# Patient Record
Sex: FEMALE | Race: WHITE | NOT HISPANIC OR LATINO | URBAN - METROPOLITAN AREA
[De-identification: names, ages, dates, MRNs, and addresses within clinical notes are randomized per-mention and may not be internally consistent; named-entity substitution may affect disease eponyms.]

---

## 2022-09-13 ENCOUNTER — EMERGENCY (EMERGENCY)
Facility: HOSPITAL | Age: 27
LOS: 1 days | Discharge: HOME | End: 2022-09-13
Attending: EMERGENCY MEDICINE | Admitting: EMERGENCY MEDICINE
Payer: COMMERCIAL

## 2022-09-13 VITALS
OXYGEN SATURATION: 97 % | WEIGHT: 143.96 LBS | HEART RATE: 79 BPM | RESPIRATION RATE: 17 BRPM | SYSTOLIC BLOOD PRESSURE: 117 MMHG | HEIGHT: 68 IN | TEMPERATURE: 98 F | DIASTOLIC BLOOD PRESSURE: 77 MMHG

## 2022-09-13 PROCEDURE — 99283 EMERGENCY DEPT VISIT LOW MDM: CPT | Mod: 25

## 2022-09-13 PROCEDURE — 71046 X-RAY EXAM CHEST 2 VIEWS: CPT | Mod: 26

## 2022-09-13 PROCEDURE — 99284 EMERGENCY DEPT VISIT MOD MDM: CPT | Mod: 25

## 2022-09-13 PROCEDURE — 71046 X-RAY EXAM CHEST 2 VIEWS: CPT

## 2022-09-13 NOTE — ED PROVIDER NOTE - PATIENT PORTAL LINK FT
You can access the FollowMyHealth Patient Portal offered by [facility] by registering at the following website: [email or web address]. By joining [employer]’s FollowMyHealth portal, you will also be able to view your health information using other applications (apps) compatible with our system.

## 2022-09-13 NOTE — ED PROVIDER NOTE - PROGRESS NOTE DETAILS
K[place]: cxr wnl, remains w/ no systemic symptoms. Discussed importance of outpt follow up and return precautions. Clinically no indication for further emergent ED workup or hospitalization at this time. Stable for dc, outpt f/u.

## 2022-09-13 NOTE — ED PROVIDER NOTE - OBJECTIVE STATEMENT
27F no PMH p/w cough. Has non-productive cough, x2.5w, worse at night. On ROS pt notes mild nasal congestion at night. Went to  ~1,5w ago and given inhaler and mucinex w/o relief. Pt's aunt is a doctor, prescribed z-pack, finished yesterday. Still has persistent cough so came to ED. No other systemic symptoms.   Recent travel from [place]. No hiking/caving or other known environmental exposures.   Works as teacher.  Training for marathon, able to run >18 miles w/o any issues.   Denies f/c, sore throat, rashes, joint pains, NVD, abd pain, urinary complaints, similar prior episodes.

## 2022-09-13 NOTE — ED PROVIDER NOTE - PHYSICAL EXAMINATION
occasional non-productive cough.  no LE edema, normal equal distal pulses, steady unassisted gait.   No tonsillar hypertrophy, exudates, erythema. No obvious LAD. No trismus. No stridor/drooling, neck FROM. Normal sounding voice. Uvula midline. No facial swelling.

## 2022-09-13 NOTE — ED PROVIDER NOTE - CLINICAL SUMMARY MEDICAL DECISION MAKING FREE TEXT BOX
27F no PMH p/w cough. Has non-productive cough, x2.5w, worse at night. On ROS pt notes mild nasal congestion at night. Went to  ~1,5w ago and given inhaler and mucinex w/o relief. Pt's aunt is a doctor, prescribed z-pack, finished yesterday. Still has persistent cough so came to ED. No other systemic symptoms.   Vitals wnl, exam as above.  ddx: Cough. Clinically benign.  Possible GERD/asthma/post-nasal drip/viral.  CXR, reassess, likely outpt f/u.

## 2022-09-13 NOTE — ED ADULT NURSE NOTE - OBJECTIVE STATEMENT
27 y.o female c/o cough x 2.5 weeks. Pt noted with dry, barking cough. Pt prescribed inhaler and zpak- finished z-julianna today. Pt unable to sleep at night. Pt denies cp, sob, fever, headache, dizziness.

## 2022-09-13 NOTE — ED ADULT TRIAGE NOTE - CHIEF COMPLAINT QUOTE
pt c/o cough for 2.5 weeks, stated about 1.5 week ago was seen at [facility], told it could be bronchitis, given inhaler and Mucinex, as cough continued rx for z-pack by PCP.  denies fever, chills.

## 2022-09-13 NOTE — ED PROVIDER NOTE - NSFOLLOWUPINSTRUCTIONS_ED_ALL_ED_FT
Can try over the counter flonase for possible post-nasal drip mediated cough.    Can try prilosec 20mg once a day for possible acid reflux mediated cough.     Can take tylenol 650mg or motrin 600mg (May cause stomach irritation - take with food and avoid prolonged use) every 6hrs as needed for pain or fever.    Stay well hydrated.    Return for fevers, persistent vomit, worsening pain, worsening breathing, worsening lightheaded.    Follow up with primary doctor within 1-2 days.     Follow up with pulmonologist for persistent symptoms. Can call [phone] to schedule appointment.     Cough    Coughing is a reflex that clears your throat and your airways. Coughing helps to heal and protect your lungs. It is normal to cough occasionally, but a cough that happens with other symptoms or lasts a long time may be a sign of a condition that needs treatment. Coughing may be caused by infections, asthma or COPD, smoking, postnasal drip, gastroesophageal reflux, as well as other medical conditions. Take medicines only as instructed by your health care provider. Avoid environments or triggers that causes you to cough at work or at home.    SEEK IMMEDIATE MEDICAL CARE IF YOU HAVE ANY OF THE FOLLOWING SYMPTOMS: coughing up blood, shortness of breath, rapid heart rate, chest pain, unexplained weight loss or night sweats.

## 2022-09-16 DIAGNOSIS — R05.9 COUGH, UNSPECIFIED: ICD-10-CM

## 2022-09-16 DIAGNOSIS — R09.81 NASAL CONGESTION: ICD-10-CM
